# Patient Record
Sex: FEMALE | Race: BLACK OR AFRICAN AMERICAN | NOT HISPANIC OR LATINO
[De-identification: names, ages, dates, MRNs, and addresses within clinical notes are randomized per-mention and may not be internally consistent; named-entity substitution may affect disease eponyms.]

---

## 2022-05-02 PROBLEM — Z00.00 ENCOUNTER FOR PREVENTIVE HEALTH EXAMINATION: Status: ACTIVE | Noted: 2022-05-02

## 2022-07-13 ENCOUNTER — NON-APPOINTMENT (OUTPATIENT)
Age: 47
End: 2022-07-13

## 2022-07-13 ENCOUNTER — APPOINTMENT (OUTPATIENT)
Dept: OBGYN | Facility: CLINIC | Age: 47
End: 2022-07-13

## 2022-07-13 ENCOUNTER — TRANSCRIPTION ENCOUNTER (OUTPATIENT)
Age: 47
End: 2022-07-13

## 2022-07-13 VITALS
WEIGHT: 176 LBS | DIASTOLIC BLOOD PRESSURE: 70 MMHG | HEIGHT: 65 IN | BODY MASS INDEX: 29.32 KG/M2 | SYSTOLIC BLOOD PRESSURE: 120 MMHG

## 2022-07-13 DIAGNOSIS — Z72.3 LACK OF PHYSICAL EXERCISE: ICD-10-CM

## 2022-07-13 DIAGNOSIS — D21.9 BENIGN NEOPLASM OF CONNECTIVE AND OTHER SOFT TISSUE, UNSPECIFIED: ICD-10-CM

## 2022-07-13 DIAGNOSIS — Z78.9 OTHER SPECIFIED HEALTH STATUS: ICD-10-CM

## 2022-07-13 DIAGNOSIS — Z01.419 ENCOUNTER FOR GYNECOLOGICAL EXAMINATION (GENERAL) (ROUTINE) W/OUT ABNORMAL FINDINGS: ICD-10-CM

## 2022-07-13 DIAGNOSIS — Z80.42 FAMILY HISTORY OF MALIGNANT NEOPLASM OF PROSTATE: ICD-10-CM

## 2022-07-13 PROCEDURE — 99072 ADDL SUPL MATRL&STAF TM PHE: CPT

## 2022-07-13 PROCEDURE — 99386 PREV VISIT NEW AGE 40-64: CPT

## 2022-07-13 NOTE — PHYSICAL EXAM
[Chaperone Declined] : Patient declined chaperone [Appropriately responsive] : appropriately responsive [Alert] : alert [No Acute Distress] : no acute distress [No Lymphadenopathy] : no lymphadenopathy [Regular Rate Rhythm] : regular rate rhythm [No Murmurs] : no murmurs [Clear to Auscultation B/L] : clear to auscultation bilaterally [Soft] : soft [Non-tender] : non-tender [Non-distended] : non-distended [No HSM] : No HSM [No Lesions] : no lesions [No Mass] : no mass [Oriented x3] : oriented x3 [Examination Of The Breasts] : a normal appearance [No Discharge] : no discharge [No Masses] : no breast masses were palpable [Labia Majora] : normal [Labia Minora] : normal [Pink Rugae] : pink rugae [Normal] : normal [Enlarged ___ wks] : enlarged [unfilled] ~Uweeks [Anteversion] : anteverted [Uterine Adnexae] : normal [Tenderness] : nontender

## 2022-07-13 NOTE — PLAN
[FreeTextEntry1] : -Since patient is asymptomatic at this time. No intervention required for large fibroid. Pt advised to notify provider if she experiences any abnormal vaginal bleeding, pelvic pain or bladder issues.\par \par -Advised yearly pelvic ultrasound to monitor growth of fibroid.\par \par -Repeat pap at 5 year interval from last pap smear.\par \par -Educational material regarding fibroids provided after lengthy discussion with patient.

## 2022-07-13 NOTE — HISTORY OF PRESENT ILLNESS
[FreeTextEntry1] : 48 y/o G0 presents for annual exam and f/u after ultrasound.\par \par Pt was sent for ultrasound for palpable lower abdominal mass by primary.\par \par Pt is a [place] sister.\par \par She recently came here from [place] and is here for a short period of time.\par \par Menstrual periods are normal and regular. They are heavier on the second day, but not overly heavy.\par \par She has no pain with periods. Bowels and urinary function are reported to be normal.\par \par Pelvic ultrasound done 5/17/22- Findings: Enlarged anteverted uterus heterogeneous bulbous measuring 17.1 x 9.7 x 14.3 cm.  Endometrial echo measures 7 mm. A large body fibroid measures 9.1 x 9 x 8.3 cm.  Adnexal regions are obscured by bowel gas and the ovaries are not visualized. No evidence of free fluid.\par \par Reports normal pap smear about 21/2 years ago.\par \par Last mammogram 5/4/22 Birads 0 followed by left breast ultrasound 6/14/22 which completed the study- Birads 2. (Copy scanned in chart).\par \par Labs done by primary brought in by patient. Pt A1C is elevated and is in the prediabetic range.\par \par Denies FH of ca of ovary, colon, breast or uterus.\par \par Sister had hysterectomy for fibroids.\par